# Patient Record
Sex: MALE | Race: BLACK OR AFRICAN AMERICAN | NOT HISPANIC OR LATINO | Employment: FULL TIME | URBAN - METROPOLITAN AREA
[De-identification: names, ages, dates, MRNs, and addresses within clinical notes are randomized per-mention and may not be internally consistent; named-entity substitution may affect disease eponyms.]

---

## 2023-03-20 ENCOUNTER — TELEPHONE (OUTPATIENT)
Dept: PRIMARY CARE | Facility: CLINIC | Age: 58
End: 2023-03-20
Payer: COMMERCIAL

## 2023-03-20 DIAGNOSIS — M25.512 CHRONIC LEFT SHOULDER PAIN: ICD-10-CM

## 2023-03-20 DIAGNOSIS — Z91.89 AT RISK FOR SIDE EFFECT OF MEDICATION: ICD-10-CM

## 2023-03-20 DIAGNOSIS — M62.838 MUSCLE SPASM: ICD-10-CM

## 2023-03-20 DIAGNOSIS — K59.1 FUNCTIONAL DIARRHEA: ICD-10-CM

## 2023-03-20 DIAGNOSIS — G89.29 CHRONIC LEFT SHOULDER PAIN: ICD-10-CM

## 2023-03-20 DIAGNOSIS — I10 BENIGN ESSENTIAL HYPERTENSION: ICD-10-CM

## 2023-03-20 DIAGNOSIS — L30.9 ECZEMA, UNSPECIFIED TYPE: Primary | ICD-10-CM

## 2023-03-20 PROBLEM — N52.9 MALE ERECTILE DISORDER OF ORGANIC ORIGIN: Status: ACTIVE | Noted: 2023-03-20

## 2023-03-20 PROBLEM — M25.469 KNEE EFFUSION: Status: RESOLVED | Noted: 2023-03-20 | Resolved: 2023-03-20

## 2023-03-20 PROBLEM — M22.2X2 PATELLOFEMORAL PAIN SYNDROME OF LEFT KNEE: Status: ACTIVE | Noted: 2023-03-20

## 2023-03-20 PROBLEM — M79.89 LEG SWELLING: Status: ACTIVE | Noted: 2023-03-20

## 2023-03-20 PROBLEM — R35.0 INCREASED FREQUENCY OF URINATION: Status: RESOLVED | Noted: 2023-03-20 | Resolved: 2023-03-20

## 2023-03-20 PROBLEM — S83.207A ACUTE MENISCAL TEAR OF LEFT KNEE: Status: RESOLVED | Noted: 2023-03-20 | Resolved: 2023-03-20

## 2023-03-20 PROBLEM — R19.7 DIARRHEA: Status: ACTIVE | Noted: 2023-03-20

## 2023-03-20 PROBLEM — R76.11 PPD+ (PURIFIED PROTEIN DERIVATIVE POSITIVE): Status: ACTIVE | Noted: 2023-03-20

## 2023-03-20 PROBLEM — R29.2 ABNORMAL REFLEXES: Status: RESOLVED | Noted: 2023-03-20 | Resolved: 2023-03-20

## 2023-03-20 PROBLEM — R20.0 HAND NUMBNESS: Status: RESOLVED | Noted: 2023-03-20 | Resolved: 2023-03-20

## 2023-03-20 PROBLEM — E78.5 HYPERLIPIDEMIA: Status: ACTIVE | Noted: 2023-03-20

## 2023-03-20 PROBLEM — M54.12 CERVICAL RADICULOPATHY: Status: ACTIVE | Noted: 2023-03-20

## 2023-03-20 PROBLEM — L03.119 CELLULITIS OF THIGH: Status: RESOLVED | Noted: 2023-03-20 | Resolved: 2023-03-20

## 2023-03-20 PROBLEM — E55.9 MILD VITAMIN D DEFICIENCY: Status: ACTIVE | Noted: 2023-03-20

## 2023-03-20 PROBLEM — M47.12 CERVICAL SPONDYLOSIS WITH MYELOPATHY: Status: ACTIVE | Noted: 2023-03-20

## 2023-03-20 RX ORDER — TRIAMCINOLONE ACETONIDE 1 MG/G
1 OINTMENT TOPICAL 2 TIMES DAILY PRN
COMMUNITY
Start: 2018-03-30 | End: 2023-03-20 | Stop reason: SDUPTHER

## 2023-03-20 RX ORDER — PREDNISONE 20 MG/1
2 TABLET ORAL DAILY
COMMUNITY
Start: 2023-03-17 | End: 2023-03-20

## 2023-03-20 RX ORDER — NAPROXEN SODIUM 550 MG/1
550 TABLET ORAL 2 TIMES DAILY PRN
Qty: 60 TABLET | Refills: 5 | Status: SHIPPED | OUTPATIENT
Start: 2023-03-20 | End: 2023-09-16

## 2023-03-20 RX ORDER — NAPROXEN SODIUM 550 MG/1
550 TABLET ORAL EVERY 12 HOURS PRN
COMMUNITY
End: 2023-03-20 | Stop reason: SDUPTHER

## 2023-03-20 RX ORDER — AMLODIPINE BESYLATE 10 MG/1
10 TABLET ORAL DAILY
COMMUNITY
Start: 2022-02-25 | End: 2023-03-20 | Stop reason: SDUPTHER

## 2023-03-20 RX ORDER — TRIAMCINOLONE ACETONIDE 1 MG/G
1 OINTMENT TOPICAL 2 TIMES DAILY PRN
Qty: 30 G | Refills: 3 | Status: SHIPPED | OUTPATIENT
Start: 2023-03-20

## 2023-03-20 RX ORDER — CYCLOBENZAPRINE HCL 10 MG
10 TABLET ORAL NIGHTLY PRN
Qty: 30 TABLET | Refills: 3 | Status: SHIPPED | OUTPATIENT
Start: 2023-03-20 | End: 2024-01-11 | Stop reason: SDUPTHER

## 2023-03-20 RX ORDER — NEBULIZER AND COMPRESSOR
1 EACH MISCELLANEOUS DAILY
Qty: 1 EACH | Refills: 0 | Status: SHIPPED | OUTPATIENT
Start: 2023-03-20 | End: 2024-02-02

## 2023-03-20 RX ORDER — CYCLOBENZAPRINE HCL 10 MG
10 TABLET ORAL NIGHTLY PRN
COMMUNITY
End: 2023-03-20 | Stop reason: SDUPTHER

## 2023-03-20 RX ORDER — LOPERAMIDE HYDROCHLORIDE 2 MG/1
2 CAPSULE ORAL 4 TIMES DAILY PRN
COMMUNITY
Start: 2021-11-18 | End: 2023-03-20 | Stop reason: SDUPTHER

## 2023-03-20 RX ORDER — AMLODIPINE BESYLATE 10 MG/1
10 TABLET ORAL DAILY
Qty: 90 TABLET | Refills: 3 | Status: SHIPPED | OUTPATIENT
Start: 2023-03-20 | End: 2024-03-19

## 2023-03-20 RX ORDER — PANTOPRAZOLE SODIUM 40 MG/1
40 TABLET, DELAYED RELEASE ORAL DAILY PRN
Qty: 90 TABLET | Refills: 3 | Status: SHIPPED | OUTPATIENT
Start: 2023-03-20 | End: 2024-03-19

## 2023-03-20 RX ORDER — LOPERAMIDE HYDROCHLORIDE 2 MG/1
2 CAPSULE ORAL 4 TIMES DAILY PRN
Qty: 90 CAPSULE | Refills: 2 | Status: SHIPPED | OUTPATIENT
Start: 2023-03-20 | End: 2023-05-19

## 2023-03-20 NOTE — ASSESSMENT & PLAN NOTE
- Renewed amlodipine 10 mg daily  - Patient does not have BP cuff at home, but monitor at home BP values, sent Rx for BP cuff

## 2023-03-20 NOTE — TELEPHONE ENCOUNTER
"Francois is a 57 y.o. male who called the [facility] pager asking for semi-urgent med renewals. States he has been out of his medications for \"days.\" Tried to call office previously and wasn't able to get through. Went to ED last week for eczema flare, getting better with steroid burst (last day today). Does not have aquaphor/vaseline, using mineral oil and doesn't feel that it's working all that well. Works as a . Does not have a BP cuff at home, but interested in having one.    Problem List Items Addressed This Visit       Eczema - Primary     - Renewed triamcinolone 0.1 external ointment  - Reviewed that rash may worsen again after stopping his steroids (today is last dose)  - Encouraged liberal application of Aquaphor/Vaseline, patient does not have either of these, sent Rx  - Reviewed to avoid scented lotions, soaps, other triggers         Relevant Medications    triamcinolone (Kenalog) 0.1 % ointment    mineral oil-hydrophilic petrolatum (Aquaphor) ointment    Other Relevant Orders    Referral to Dermatology    Diarrhea     Renewed loperamide at patient's request         Relevant Medications    loperamide (Imodium) 2 mg capsule    Benign essential hypertension     - Renewed amlodipine 10 mg daily  - Patient does not have BP cuff at home, but monitor at home BP values, sent Rx for BP cuff         Relevant Medications    amLODIPine (Norvasc) 10 mg tablet    miscellaneous medical supply (Blood Pressure Cuff) misc    Left shoulder pain     - Renewed naproxen at patient's request  - Discussed gastric protection with PPI on days that he takes it, patient amenable, Rx sent         Relevant Medications    naproxen sodium (Anaprox) 550 mg tablet    Muscle spasm     Improved with cyclobenzaprine, renewed         Relevant Medications    cyclobenzaprine (Flexeril) 10 mg tablet     Other Visit Diagnoses       At risk for side effect of medication        Relevant Medications    pantoprazole (ProtoNix) 40 mg EC tablet      "     All questions answered by end of phone call, patient content.    Maria Ines Leong MD  Family Medicine PGY2  [facility]

## 2023-03-20 NOTE — ASSESSMENT & PLAN NOTE
- Renewed triamcinolone 0.1 external ointment  - Reviewed that rash may worsen again after stopping his steroids (today is last dose)  - Encouraged liberal application of Aquaphor/Vaseline, patient does not have either of these, sent Rx  - Reviewed to avoid scented lotions, soaps, other triggers

## 2023-03-20 NOTE — ASSESSMENT & PLAN NOTE
- Renewed naproxen at patient's request  - Discussed gastric protection with PPI on days that he takes it, patient amenable, Rx sent

## 2024-01-11 DIAGNOSIS — M62.838 MUSCLE SPASM: ICD-10-CM

## 2024-01-11 RX ORDER — CYCLOBENZAPRINE HCL 10 MG
10 TABLET ORAL NIGHTLY PRN
Qty: 30 TABLET | Refills: 3 | Status: SHIPPED | OUTPATIENT
Start: 2024-01-11 | End: 2024-02-02 | Stop reason: SDUPTHER

## 2024-01-11 NOTE — TELEPHONE ENCOUNTER
Pt called requesting refill of cyclobenzaprine. Noted pt not seen in over a year. Call placed to pt to encourage scheduling to establish care. Appt scheduled. Order pended and routed to provider.

## 2024-02-02 ENCOUNTER — OFFICE VISIT (OUTPATIENT)
Dept: PRIMARY CARE | Facility: CLINIC | Age: 59
End: 2024-02-02
Payer: COMMERCIAL

## 2024-02-02 VITALS
BODY MASS INDEX: 24.45 KG/M2 | HEART RATE: 91 BPM | OXYGEN SATURATION: 95 % | HEIGHT: 69 IN | WEIGHT: 165.1 LBS | TEMPERATURE: 98.2 F | DIASTOLIC BLOOD PRESSURE: 94 MMHG | SYSTOLIC BLOOD PRESSURE: 148 MMHG

## 2024-02-02 DIAGNOSIS — M47.12 CERVICAL SPONDYLOSIS WITH MYELOPATHY: Primary | ICD-10-CM

## 2024-02-02 DIAGNOSIS — M62.838 MUSCLE SPASM: ICD-10-CM

## 2024-02-02 DIAGNOSIS — I10 BENIGN ESSENTIAL HYPERTENSION: ICD-10-CM

## 2024-02-02 DIAGNOSIS — N40.0 BENIGN PROSTATIC HYPERPLASIA WITHOUT LOWER URINARY TRACT SYMPTOMS: ICD-10-CM

## 2024-02-02 PROBLEM — R21 RASH AND OTHER NONSPECIFIC SKIN ERUPTION: Status: ACTIVE | Noted: 2021-07-14

## 2024-02-02 PROCEDURE — 99214 OFFICE O/P EST MOD 30 MIN: CPT | Performed by: PODIATRIST

## 2024-02-02 PROCEDURE — 3080F DIAST BP >= 90 MM HG: CPT | Performed by: PODIATRIST

## 2024-02-02 PROCEDURE — 3077F SYST BP >= 140 MM HG: CPT | Performed by: PODIATRIST

## 2024-02-02 PROCEDURE — 1036F TOBACCO NON-USER: CPT | Performed by: PODIATRIST

## 2024-02-02 RX ORDER — LOPERAMIDE HYDROCHLORIDE 2 MG/1
CAPSULE ORAL
COMMUNITY
Start: 2023-10-27 | End: 2024-02-02 | Stop reason: SDUPTHER

## 2024-02-02 RX ORDER — NAPROXEN SODIUM 550 MG/1
1 TABLET ORAL EVERY 12 HOURS PRN
COMMUNITY

## 2024-02-02 RX ORDER — PREDNISONE 20 MG/1
TABLET ORAL
COMMUNITY
Start: 2023-05-09

## 2024-02-02 RX ORDER — LOPERAMIDE HYDROCHLORIDE 2 MG/1
CAPSULE ORAL
Qty: 30 CAPSULE | Refills: 8 | Status: SHIPPED | OUTPATIENT
Start: 2024-02-02

## 2024-02-02 RX ORDER — TAMSULOSIN HYDROCHLORIDE 0.4 MG/1
0.4 CAPSULE ORAL DAILY
Qty: 30 CAPSULE | Refills: 11 | Status: SHIPPED | OUTPATIENT
Start: 2024-02-02 | End: 2025-02-01

## 2024-02-02 RX ORDER — CYCLOBENZAPRINE HCL 10 MG
10 TABLET ORAL NIGHTLY PRN
Qty: 30 TABLET | Refills: 3 | Status: SHIPPED | OUTPATIENT
Start: 2024-02-02 | End: 2024-07-01

## 2024-02-02 ASSESSMENT — ENCOUNTER SYMPTOMS
DIZZINESS: 0
FEVER: 0
SINUS PRESSURE: 0
DIAPHORESIS: 0
HEADACHES: 0
CHILLS: 0
DIFFICULTY URINATING: 1
DYSURIA: 0
SHORTNESS OF BREATH: 0
ABDOMINAL DISTENTION: 0
ABDOMINAL PAIN: 0
NUMBNESS: 0
COUGH: 0
AGITATION: 0
FATIGUE: 0
ARTHRALGIAS: 0
CHEST TIGHTNESS: 0
SORE THROAT: 0
FLANK PAIN: 0
ADENOPATHY: 0
BACK PAIN: 0
EYE ITCHING: 0
RHINORRHEA: 0
HEMATURIA: 0
VOICE CHANGE: 0
EYE REDNESS: 0

## 2024-02-02 ASSESSMENT — PAIN SCALES - GENERAL: PAINLEVEL: 10-WORST PAIN EVER

## 2024-02-02 NOTE — PROGRESS NOTES
Subjective   Patient ID: Francois Shepherd is a 58 y.o. male with PMHx of HTN, HLD, diarrhea who presents for Establish Care.    HPI   Cramping in hands  This cramping in his left hand has been going on for years. He says his left hand is numb.  He is s/p C3-4 ACDF for cervical stenosis with myelopathy w/ Dr. Denton on 1/27/22. The flexeril and naproxen do not help.    Difficulty urinating   This has been going on for years and has gradually became worse. He voids small amounts. Sometimes has difficulty starting the stream.   He admits to frequent or urgent need to void. He often voids more often at night. He has trouble starting to void. He has a weak urine stream, or a stream that stops and starts. He is not able to fully empty his bladder.    HTN  He takes his amlodipine daily. He does not check his blood pressure at home as he does not have a BP cuff. The last BP cuff we ordered was not covered at his insurance.     Social history  He retired one month ago. He was previously a . He is enjoying long-term and trying to find things to do with his time.     Review of Systems   Constitutional:  Negative for chills, diaphoresis, fatigue and fever.   HENT:  Negative for congestion, rhinorrhea, sinus pressure, sore throat and voice change.    Eyes:  Negative for redness and itching.   Respiratory:  Negative for cough, chest tightness and shortness of breath.    Cardiovascular:  Negative for chest pain.   Gastrointestinal:  Negative for abdominal distention and abdominal pain.   Endocrine: Negative for cold intolerance and heat intolerance.   Genitourinary:  Positive for difficulty urinating. Negative for dysuria, flank pain and hematuria.   Musculoskeletal:  Negative for arthralgias and back pain.   Neurological:  Negative for dizziness, numbness and headaches.   Hematological:  Negative for adenopathy.   Psychiatric/Behavioral:  Negative for agitation and behavioral problems.        Objective   BP (!) 164/95  "  Pulse 98   Temp 36.8 °C (98.2 °F) (Tympanic)   Ht 1.753 m (5' 9\")   Wt 74.9 kg (165 lb 1.6 oz)   SpO2 95%   BMI 24.38 kg/m²     Physical Exam  Vitals and nursing note reviewed.   Constitutional:       Appearance: Normal appearance.   HENT:      Head: Normocephalic and atraumatic.   Eyes:      Extraocular Movements: Extraocular movements intact.      Conjunctiva/sclera: Conjunctivae normal.   Cardiovascular:      Rate and Rhythm: Normal rate and regular rhythm.      Heart sounds: Normal heart sounds.   Pulmonary:      Effort: Pulmonary effort is normal.      Breath sounds: Normal breath sounds.   Abdominal:      General: Bowel sounds are normal.      Palpations: Abdomen is soft.      Tenderness: There is no abdominal tenderness. There is no guarding.   Musculoskeletal:         General: No swelling. Normal range of motion.      Left hand: Normal. No swelling, deformity or tenderness. Normal range of motion. Normal strength. Normal sensation. Normal pulse.      Cervical back: Normal range of motion and neck supple.   Skin:     General: Skin is warm.   Neurological:      General: No focal deficit present.      Mental Status: He is alert and oriented to person, place, and time. Mental status is at baseline.      Motor: Motor function is intact.   Psychiatric:         Mood and Affect: Mood normal.         Behavior: Behavior normal.         Thought Content: Thought content normal.         Judgment: Judgment normal.         Assessment/Plan     Mr. Francois Shepherd is a 58 y.o. male with PMHx of HTN, HLD, diarrhea who presents for Establish Care.    Diagnoses and all orders for this visit:  Cervical spondylosis with myelopathy  -     Referral to Physical Therapy; Future placed  -     Referral to Orthopaedic Surgery; Future placed  Diarrhea   -    Renewed loperamide (Imodium) 2 mg capsule; Take 1 capsule 2 mg by mouth as needed four times daily for diarrhea.  Muscle spasm  -     Renewed cyclobenzaprine (Flexeril) 10 mg " tablet; Take 1 tablet (10 mg) by mouth as needed at bedtime for muscle spasms.  Benign prostatic hyperplasia without lower urinary tract symptoms  -     Started tamsulosin (Flomax) 0.4 mg 24 hr capsule; Take 1 capsule (0.4 mg) by mouth once daily.  Benign essential hypertension  -     Ordered miscellaneous medical supply [facility]; Please dispense automated blood pressure device that is most adequately covered by patient's insurance plan.   -     Advised patient to check blood pressure daily at same time of day and to have arm resting at heart level; sit upright in a chair with legs uncrossed and to rest 5 minutes before taking.    -     Patient encouraged to keep a blood pressure log.   Return to clinic          -    In 3 months for follow up of HTN, BPH on new medication Flomax and L hand cramping     Patient discussed with attending physician Dr. Ibarra.     Plan preliminary until cosigned by attending physician.     Ashley Nicole MD   PGY1, Family Medicine   [facility]  Available by [employer]

## 2024-02-02 NOTE — PATIENT INSTRUCTIONS
Thank you for coming in to see us today, Mr. Francois Shepherd! It was a pleasure managing your health together.    Today in clinic, we discussed Left hand numbness , BPH and high blood pressure.    If we ordered bloodwork today, you can get this done at ANY  location and the results will come to me directly. [facility] here at [facility] are walk-in (no appointment needed); [facility]'s hours are M-F 6:30a-6p and Sat 8a-12p.      If you have any questions/concerns, you can always use "[employer]" to message me directly. Or if you prefer, you can call the office at [phone]; if you leave a message, the office staff should translate this to a message in my inbox.    I'm looking forward to seeing you back in clinic in 3 to discuss follow up BPH.    Best,  Dr. Nicole

## 2024-02-06 ENCOUNTER — CLINICAL SUPPORT (OUTPATIENT)
Dept: EMERGENCY MEDICINE | Facility: HOSPITAL | Age: 59
End: 2024-02-06
Payer: COMMERCIAL

## 2024-02-06 ENCOUNTER — HOSPITAL ENCOUNTER (EMERGENCY)
Facility: HOSPITAL | Age: 59
Discharge: HOME | End: 2024-02-06
Attending: EMERGENCY MEDICINE
Payer: COMMERCIAL

## 2024-02-06 VITALS
DIASTOLIC BLOOD PRESSURE: 94 MMHG | HEIGHT: 69 IN | HEART RATE: 110 BPM | OXYGEN SATURATION: 98 % | RESPIRATION RATE: 18 BRPM | WEIGHT: 161 LBS | BODY MASS INDEX: 23.85 KG/M2 | SYSTOLIC BLOOD PRESSURE: 160 MMHG | TEMPERATURE: 98.7 F

## 2024-02-06 DIAGNOSIS — R53.1 WEAKNESS: ICD-10-CM

## 2024-02-06 DIAGNOSIS — R52 BODY ACHES: Primary | ICD-10-CM

## 2024-02-06 LAB
ALBUMIN SERPL BCP-MCNC: 4.4 G/DL (ref 3.4–5)
ALP SERPL-CCNC: 76 U/L (ref 33–120)
ALT SERPL W P-5'-P-CCNC: 36 U/L (ref 10–52)
ANION GAP SERPL CALC-SCNC: 14 MMOL/L (ref 10–20)
AST SERPL W P-5'-P-CCNC: 52 U/L (ref 9–39)
BASOPHILS # BLD AUTO: 0.09 X10*3/UL (ref 0–0.1)
BASOPHILS NFR BLD AUTO: 1.1 %
BILIRUB SERPL-MCNC: 1.1 MG/DL (ref 0–1.2)
BUN SERPL-MCNC: 9 MG/DL (ref 6–23)
CALCIUM SERPL-MCNC: 9.9 MG/DL (ref 8.6–10.6)
CHLORIDE SERPL-SCNC: 97 MMOL/L (ref 98–107)
CO2 SERPL-SCNC: 27 MMOL/L (ref 21–32)
CREAT SERPL-MCNC: 0.99 MG/DL (ref 0.5–1.3)
EGFRCR SERPLBLD CKD-EPI 2021: 89 ML/MIN/1.73M*2
EOSINOPHIL # BLD AUTO: 0.27 X10*3/UL (ref 0–0.7)
EOSINOPHIL NFR BLD AUTO: 3.2 %
ERYTHROCYTE [DISTWIDTH] IN BLOOD BY AUTOMATED COUNT: 14.2 % (ref 11.5–14.5)
FLUAV RNA RESP QL NAA+PROBE: NOT DETECTED
FLUBV RNA RESP QL NAA+PROBE: NOT DETECTED
GLUCOSE SERPL-MCNC: 102 MG/DL (ref 74–99)
HCT VFR BLD AUTO: 41.8 % (ref 41–52)
HGB BLD-MCNC: 15.3 G/DL (ref 13.5–17.5)
IMM GRANULOCYTES # BLD AUTO: 0.02 X10*3/UL (ref 0–0.7)
IMM GRANULOCYTES NFR BLD AUTO: 0.2 % (ref 0–0.9)
LYMPHOCYTES # BLD AUTO: 1.44 X10*3/UL (ref 1.2–4.8)
LYMPHOCYTES NFR BLD AUTO: 17.1 %
MAGNESIUM SERPL-MCNC: 1.79 MG/DL (ref 1.6–2.4)
MCH RBC QN AUTO: 30.5 PG (ref 26–34)
MCHC RBC AUTO-ENTMCNC: 36.6 G/DL (ref 32–36)
MCV RBC AUTO: 83 FL (ref 80–100)
MONOCYTES # BLD AUTO: 1.04 X10*3/UL (ref 0.1–1)
MONOCYTES NFR BLD AUTO: 12.3 %
NEUTROPHILS # BLD AUTO: 5.58 X10*3/UL (ref 1.2–7.7)
NEUTROPHILS NFR BLD AUTO: 66.1 %
NRBC BLD-RTO: 0 /100 WBCS (ref 0–0)
PLATELET # BLD AUTO: 247 X10*3/UL (ref 150–450)
POTASSIUM SERPL-SCNC: 3.5 MMOL/L (ref 3.5–5.3)
PROT SERPL-MCNC: 7.6 G/DL (ref 6.4–8.2)
RBC # BLD AUTO: 5.01 X10*6/UL (ref 4.5–5.9)
SARS-COV-2 RNA RESP QL NAA+PROBE: NOT DETECTED
SODIUM SERPL-SCNC: 134 MMOL/L (ref 136–145)
WBC # BLD AUTO: 8.4 X10*3/UL (ref 4.4–11.3)

## 2024-02-06 PROCEDURE — 93005 ELECTROCARDIOGRAM TRACING: CPT

## 2024-02-06 PROCEDURE — 93010 ELECTROCARDIOGRAM REPORT: CPT | Performed by: PHYSICIAN ASSISTANT

## 2024-02-06 PROCEDURE — 84075 ASSAY ALKALINE PHOSPHATASE: CPT

## 2024-02-06 PROCEDURE — 83735 ASSAY OF MAGNESIUM: CPT

## 2024-02-06 PROCEDURE — 99284 EMERGENCY DEPT VISIT MOD MDM: CPT

## 2024-02-06 PROCEDURE — 99284 EMERGENCY DEPT VISIT MOD MDM: CPT | Mod: 25 | Performed by: EMERGENCY MEDICINE

## 2024-02-06 PROCEDURE — 36415 COLL VENOUS BLD VENIPUNCTURE: CPT

## 2024-02-06 PROCEDURE — 99283 EMERGENCY DEPT VISIT LOW MDM: CPT | Mod: 25,27

## 2024-02-06 PROCEDURE — 85025 COMPLETE CBC W/AUTO DIFF WBC: CPT

## 2024-02-06 PROCEDURE — 2500000001 HC RX 250 WO HCPCS SELF ADMINISTERED DRUGS (ALT 637 FOR MEDICARE OP)

## 2024-02-06 PROCEDURE — 87636 SARSCOV2 & INF A&B AMP PRB: CPT

## 2024-02-06 RX ORDER — IBUPROFEN 400 MG/1
800 TABLET ORAL ONCE
Status: COMPLETED | OUTPATIENT
Start: 2024-02-06 | End: 2024-02-06

## 2024-02-06 RX ADMIN — IBUPROFEN 800 MG: 400 TABLET, FILM COATED ORAL at 19:20

## 2024-02-06 ASSESSMENT — PAIN SCALES - GENERAL: PAINLEVEL_OUTOF10: 10 - WORST POSSIBLE PAIN

## 2024-02-06 ASSESSMENT — LIFESTYLE VARIABLES
EVER FELT BAD OR GUILTY ABOUT YOUR DRINKING: NO
HAVE PEOPLE ANNOYED YOU BY CRITICIZING YOUR DRINKING: NO
EVER HAD A DRINK FIRST THING IN THE MORNING TO STEADY YOUR NERVES TO GET RID OF A HANGOVER: NO
HAVE YOU EVER FELT YOU SHOULD CUT DOWN ON YOUR DRINKING: NO

## 2024-02-06 ASSESSMENT — COLUMBIA-SUICIDE SEVERITY RATING SCALE - C-SSRS
6. HAVE YOU EVER DONE ANYTHING, STARTED TO DO ANYTHING, OR PREPARED TO DO ANYTHING TO END YOUR LIFE?: NO
2. HAVE YOU ACTUALLY HAD ANY THOUGHTS OF KILLING YOURSELF?: NO
1. IN THE PAST MONTH, HAVE YOU WISHED YOU WERE DEAD OR WISHED YOU COULD GO TO SLEEP AND NOT WAKE UP?: NO

## 2024-02-06 ASSESSMENT — PAIN DESCRIPTION - PAIN TYPE: TYPE: ACUTE PAIN

## 2024-02-06 ASSESSMENT — PAIN - FUNCTIONAL ASSESSMENT: PAIN_FUNCTIONAL_ASSESSMENT: 0-10

## 2024-02-06 ASSESSMENT — PAIN DESCRIPTION - LOCATION: LOCATION: GENERALIZED

## 2024-02-07 LAB
ATRIAL RATE: 105 BPM
P AXIS: 75 DEGREES
P OFFSET: 191 MS
P ONSET: 127 MS
PR INTERVAL: 186 MS
Q ONSET: 220 MS
QRS COUNT: 17 BEATS
QRS DURATION: 104 MS
QT INTERVAL: 348 MS
QTC CALCULATION(BAZETT): 459 MS
QTC FREDERICIA: 419 MS
R AXIS: -16 DEGREES
T AXIS: 66 DEGREES
T OFFSET: 394 MS
VENTRICULAR RATE: 105 BPM

## 2024-02-07 NOTE — ED PROVIDER NOTES
HPI   Chief Complaint   Patient presents with    Flu Symptoms       Patient is a 58-year-old male with a past medical history significant for HTN and HDL presenting to the ED with weakness, fatigue, and generalized bodyaches ongoing for 1 week.  Patient claims he has been managing his symptoms with over-the-counter medications such as DayQuil at home with minimal relief.  He denies sick contacts.  He also denies fever/chills, headache, dizziness, cough, congestion, sore throat, chest pain, shortness of breath, abdominal pain, nausea/vomiting, or changes in bowel or bladder habits.              Jenni Coma Scale Score: 15                     Patient History   Past Medical History:   Diagnosis Date    Abnormal reflexes 03/20/2023    Hand numbness 03/20/2023    Increased frequency of urination 03/20/2023     History reviewed. No pertinent surgical history.  No family history on file.  Social History     Tobacco Use    Smoking status: Never    Smokeless tobacco: Never   Substance Use Topics    Alcohol use: Yes    Drug use: Never       Physical Exam   ED Triage Vitals [02/06/24 1822]   Temperature Heart Rate Respirations BP   37.1 °C (98.7 °F) (!) 110 18 (!) 160/94      Pulse Ox Temp Source Heart Rate Source Patient Position   98 % Temporal Monitor Sitting      BP Location FiO2 (%)     Right arm 21 %       Physical Exam  Vitals reviewed.   Constitutional:       General: He is not in acute distress.     Appearance: He is not ill-appearing.   HENT:      Head: Normocephalic and atraumatic.      Nose: Nose normal. No congestion or rhinorrhea.      Mouth/Throat:      Mouth: Mucous membranes are moist.      Pharynx: Oropharynx is clear. No oropharyngeal exudate or posterior oropharyngeal erythema.   Eyes:      Conjunctiva/sclera: Conjunctivae normal.      Pupils: Pupils are equal, round, and reactive to light.   Cardiovascular:      Rate and Rhythm: Regular rhythm. Tachycardia present.   Pulmonary:      Effort: Pulmonary  effort is normal.      Breath sounds: Normal breath sounds.   Abdominal:      General: Bowel sounds are normal.      Palpations: Abdomen is soft.   Musculoskeletal:      Cervical back: Normal range of motion and neck supple. No tenderness.   Lymphadenopathy:      Cervical: No cervical adenopathy.   Skin:     General: Skin is warm and dry.   Neurological:      General: No focal deficit present.      Mental Status: He is alert and oriented to person, place, and time.   Psychiatric:         Mood and Affect: Mood normal.         Behavior: Behavior normal.         ED Course & MDM   Diagnoses as of 02/06/24 2104   Body aches   Weakness     Labs Reviewed   CBC WITH AUTO DIFFERENTIAL - Abnormal       Result Value    WBC 8.4      nRBC 0.0      RBC 5.01      Hemoglobin 15.3      Hematocrit 41.8      MCV 83      MCH 30.5      MCHC 36.6 (*)     RDW 14.2      Platelets 247      Neutrophils % 66.1      Immature Granulocytes %, Automated 0.2      Lymphocytes % 17.1      Monocytes % 12.3      Eosinophils % 3.2      Basophils % 1.1      Neutrophils Absolute 5.58      Immature Granulocytes Absolute, Automated 0.02      Lymphocytes Absolute 1.44      Monocytes Absolute 1.04 (*)     Eosinophils Absolute 0.27      Basophils Absolute 0.09     COMPREHENSIVE METABOLIC PANEL - Abnormal    Glucose 102 (*)     Sodium 134 (*)     Potassium 3.5      Chloride 97 (*)     Bicarbonate 27      Anion Gap 14      Urea Nitrogen 9      Creatinine 0.99      eGFR 89      Calcium 9.9      Albumin 4.4      Alkaline Phosphatase 76      Total Protein 7.6      AST 52 (*)     Bilirubin, Total 1.1      ALT 36     SARS-COV-2 AND INFLUENZA A/B PCR - Normal    Flu A Result Not Detected      Flu B Result Not Detected      Coronavirus 2019, PCR Not Detected      Narrative:     This assay has received FDA Emergency Use Authorization (EUA) and  is only authorized for the duration of time that circumstances exist to justify the authorization of the emergency use of in  vitro diagnostic tests for the detection of SARS-CoV-2 virus and/or diagnosis of COVID-19 infection under section 564(b)(1) of the Act, 21 U.S.C. 360bbb-3(b)(1). Testing for SARS-CoV-2 is only recommended for patients who meet current clinical and/or epidemiological criteria as defined by federal, state, or local public health directives. This assay is an in vitro diagnostic nucleic acid amplification test for the qualitative detection of SARS-CoV-2, Influenza A, and Influenza B from nasopharyngeal specimens and has been validated for use at [facility]. Negative results do not preclude COVID-19 infections or Influenza A/B infections, and should not be used as the sole basis for diagnosis, treatment, or other management decisions. If Influenza A/B and RSV PCR results are negative, testing for Parainfluenza virus, Adenovirus and Metapneumovirus is routinely performed for [facility] and intensive care inpatients, and is available on other patients by placing an add-on request.    MAGNESIUM - Normal    Magnesium 1.79         Medical Decision Making  Patient is a 58-year-old male with a past medical history significant for HTN and HDL presenting to the ED with weakness, fatigue, and generalized bodyaches ongoing for 1 week.  History was obtained from the patient.  On physical exam, he appears stable and in no acute distress.  Lungs are clear to auscultation bilaterally.  No congestion or rhinorrhea.  Oropharynx is clear without erythema or tonsillar exudate.  Full ROM of all extremities without tenderness.  No lymphadenopathy.  Remainder of exam as noted above.  Patient is hypertensive with a blood pressure of 160/94 and tachycardic with a heart rate of 110.  He claims that he took his medications this morning and that these numbers are normal for him.  Other vital signs stable.    Patient was given ibuprofen in the ED for his body aches.  Differential consists of anemia, electrolyte  abnormality, COVID, influenza, or other viral syndrome.  EKG normal.  CBC, CMP, and magnesium were unremarkable.  Viral swabs were negative.  It is likely that the patient has some other viral syndrome at this time.  He remains hemodynamically stable and ready for discharge at this time.  He was instructed to take Tylenol or Advil at home as needed for body aches and pains.  His symptoms should start to subside within the coming days.  He can follow-up with his primary care provider if symptoms persist.  Patient was educated on signs and symptoms of would warrant returning to the ED.  He is agreeable and understanding to the plan and all of his questions were answered to satisfaction.        Procedure  Procedures     eBlem Barnes PA-C  02/06/24 3402

## 2024-02-07 NOTE — DISCHARGE INSTRUCTIONS
Your blood work was unremarkable and your viral swabs for COVID and influenza were negative.  You can take Tylenol or Advil at home as needed for your body aches and pains.  Be sure to eat as tolerated and stay well-hydrated.  It is likely that you have some other virus at this time.  This should run its course and start to improve within the coming days.  Please continue to rest and monitor your symptoms.  You can make an appointment with your primary care provider within the next week if symptoms persist/do not improve.

## 2024-02-09 NOTE — PROGRESS NOTES
I reviewed the resident/fellow's documentation and discussed the patient with the resident/fellow. I agree with the resident/fellow's medical decision making as documented in the note.    Lidya Ibarra MD

## 2024-02-15 ENCOUNTER — APPOINTMENT (OUTPATIENT)
Dept: PHYSICAL THERAPY | Facility: CLINIC | Age: 59
End: 2024-02-15
Payer: COMMERCIAL

## 2024-02-20 ENCOUNTER — APPOINTMENT (OUTPATIENT)
Dept: PHYSICAL THERAPY | Facility: CLINIC | Age: 59
End: 2024-02-20
Payer: COMMERCIAL

## 2024-02-23 PROBLEM — N40.0 BENIGN PROSTATIC HYPERPLASIA: Status: ACTIVE | Noted: 2024-02-23

## 2024-02-23 PROBLEM — R03.0 ELEVATED BLOOD PRESSURE READING WITHOUT DIAGNOSIS OF HYPERTENSION: Status: ACTIVE | Noted: 2024-02-23

## 2024-02-23 PROBLEM — R29.898 WEAKNESS OF HAND: Status: ACTIVE | Noted: 2024-02-23

## 2024-02-23 PROBLEM — K92.1 HEMATOCHEZIA: Status: ACTIVE | Noted: 2024-02-23

## 2024-02-23 PROBLEM — R53.1 WEAKNESS: Status: ACTIVE | Noted: 2024-02-23

## 2024-02-23 PROBLEM — M62.81 MUSCLE WEAKNESS: Status: ACTIVE | Noted: 2024-02-23

## 2024-10-28 ENCOUNTER — HOSPITAL ENCOUNTER (EMERGENCY)
Facility: HOSPITAL | Age: 59
Discharge: HOME | End: 2024-10-28

## 2024-10-28 VITALS
TEMPERATURE: 97.7 F | DIASTOLIC BLOOD PRESSURE: 96 MMHG | OXYGEN SATURATION: 99 % | RESPIRATION RATE: 16 BRPM | SYSTOLIC BLOOD PRESSURE: 173 MMHG | HEART RATE: 90 BPM

## 2024-10-28 DIAGNOSIS — L30.9 ECZEMA, UNSPECIFIED TYPE: ICD-10-CM

## 2024-10-28 DIAGNOSIS — L20.9 ATOPIC DERMATITIS IN ADULT: Primary | ICD-10-CM

## 2024-10-28 PROCEDURE — 99283 EMERGENCY DEPT VISIT LOW MDM: CPT

## 2024-10-28 RX ORDER — PREDNISONE 50 MG/1
50 TABLET ORAL DAILY
Qty: 5 TABLET | Refills: 0 | Status: SHIPPED | OUTPATIENT
Start: 2024-10-28 | End: 2024-11-02

## 2024-10-28 RX ORDER — TRIAMCINOLONE ACETONIDE 1 MG/G
1 OINTMENT TOPICAL 2 TIMES DAILY PRN
Qty: 80 G | Refills: 0 | Status: SHIPPED | OUTPATIENT
Start: 2024-10-28

## 2024-10-28 ASSESSMENT — COLUMBIA-SUICIDE SEVERITY RATING SCALE - C-SSRS
1. IN THE PAST MONTH, HAVE YOU WISHED YOU WERE DEAD OR WISHED YOU COULD GO TO SLEEP AND NOT WAKE UP?: NO
2. HAVE YOU ACTUALLY HAD ANY THOUGHTS OF KILLING YOURSELF?: NO
6. HAVE YOU EVER DONE ANYTHING, STARTED TO DO ANYTHING, OR PREPARED TO DO ANYTHING TO END YOUR LIFE?: NO